# Patient Record
Sex: MALE | Race: BLACK OR AFRICAN AMERICAN | NOT HISPANIC OR LATINO | URBAN - METROPOLITAN AREA
[De-identification: names, ages, dates, MRNs, and addresses within clinical notes are randomized per-mention and may not be internally consistent; named-entity substitution may affect disease eponyms.]

---

## 2024-10-18 ENCOUNTER — EMERGENCY (EMERGENCY)
Facility: HOSPITAL | Age: 34
LOS: 0 days | Discharge: HOME | End: 2024-10-19
Attending: EMERGENCY MEDICINE
Payer: COMMERCIAL

## 2024-10-18 VITALS
HEIGHT: 64 IN | SYSTOLIC BLOOD PRESSURE: 142 MMHG | HEART RATE: 57 BPM | OXYGEN SATURATION: 99 % | TEMPERATURE: 98 F | DIASTOLIC BLOOD PRESSURE: 84 MMHG | WEIGHT: 169.98 LBS | RESPIRATION RATE: 17 BRPM

## 2024-10-18 DIAGNOSIS — J02.9 ACUTE PHARYNGITIS, UNSPECIFIED: ICD-10-CM

## 2024-10-18 PROCEDURE — 99284 EMERGENCY DEPT VISIT MOD MDM: CPT

## 2024-10-18 NOTE — ED ADULT TRIAGE NOTE - CHIEF COMPLAINT QUOTE
Pt complains of sore throat on the right side 1-2 weeks. States anytime he eats he feels like the food gets stuck. States he was seen in Urgent Care for same complaint and was given antiobiotics which he states he completed but symptoms got worse.

## 2024-10-19 VITALS
TEMPERATURE: 98 F | SYSTOLIC BLOOD PRESSURE: 120 MMHG | DIASTOLIC BLOOD PRESSURE: 77 MMHG | HEART RATE: 55 BPM | RESPIRATION RATE: 18 BRPM | OXYGEN SATURATION: 99 %

## 2024-10-19 RX ADMIN — Medication 600 MILLIGRAM(S): at 00:35

## 2024-10-19 RX ADMIN — Medication 1 TABLET(S): at 00:35

## 2024-10-19 NOTE — ED PROVIDER NOTE - MUSCULOSKELETAL, MLM
Xray contacted for imaging   Spine appears normal, range of motion is not limited, no muscle or joint tenderness

## 2024-10-19 NOTE — ED PROVIDER NOTE - CONDITION AT DISCHARGE:
Satisfactory Burow's Advancement Flap Text: The defect edges were debeveled with a #15 scalpel blade.  Given the location of the defect and the proximity to free margins a Burow's advancement flap was deemed most appropriate.  Using a sterile surgical marker, the appropriate advancement flap was drawn incorporating the defect and placing the expected incisions within the relaxed skin tension lines where possible.    The area thus outlined was incised deep to adipose tissue with a #15 scalpel blade.  The skin margins were undermined to an appropriate distance in all directions utilizing iris scissors.

## 2024-10-19 NOTE — ED PROVIDER NOTE - PATIENT PORTAL LINK FT
You can access the FollowMyHealth Patient Portal offered by [facility] by registering at the following website: [email or web address]. By joining [employer]’s FollowMyHealth portal, you will also be able to view your health information using other applications (apps) compatible with our system.

## 2024-10-19 NOTE — ED PROVIDER NOTE - CARE PROVIDER_API CALL
Donato Diego  Internal Medicine  [address] [place] [address]  Phone: [phone]  Fax: [phone]  Follow Up Time: 1-3 Days

## 2024-10-19 NOTE — ED PROVIDER NOTE - CLINICAL SUMMARY MEDICAL DECISION MAKING FREE TEXT BOX
pt walked in c/o pain to his throat x 2 weeks without fever, chills, nausea, vomiting, abd pain, Pt is speaking in clear full sentences no drooling appears very comfortable Pt has normal vital signs

## 2024-10-19 NOTE — ED ADULT NURSE NOTE - OBJECTIVE STATEMENT
pt is AOx3 ambulatory with steady gait. pt presents to the ED tonight with complaints of sore throat for about 2 weeks. pt states he was seen in Urgent Care for the same complaint - pt was prescribed antibiotics which he completed the course but states his symptoms only got worse. pt reports feeling as if food is getting stuck in his throat. pt denies any fever, chills, cough, runny nose. pt rates his pain a 8/10, denies taking pain medication PTA in the ED tonight. pt denies any CP, SOB, fever, chills, N/V/D. pt denies any PMH

## 2024-10-19 NOTE — ED PROVIDER NOTE - THROAT FINDINGS
tonsils are not swelling uvula midline no oral mucosal edema/no exudate/THROAT RED/uvula midline/no vesicles/NO VESICLES/ULCERS/NO DROOLING/NO TONGUE ELEVATION/NO STRIDOR

## 2024-10-19 NOTE — ED PROVIDER NOTE - CONSTITUTIONAL, MLM
Well appearing, awake, alert, oriented to person, place, time/situation and in no apparent distress. Speaking in clear full sentences no nasal flaring no shoulders retractions no diaphoresis no drooling, appears very comfortable normal...

## 2024-10-19 NOTE — ED PROVIDER NOTE - NSFOLLOWUPINSTRUCTIONS_ED_ALL_ED_FT
please follow up with Dr. Diego and return if symptoms persist or worsen or unable to tolerate oral fluid

## 2024-10-20 LAB
CULTURE RESULTS: SIGNIFICANT CHANGE UP
SPECIMEN SOURCE: SIGNIFICANT CHANGE UP